# Patient Record
Sex: FEMALE | Race: WHITE | NOT HISPANIC OR LATINO
[De-identification: names, ages, dates, MRNs, and addresses within clinical notes are randomized per-mention and may not be internally consistent; named-entity substitution may affect disease eponyms.]

---
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
## 2024-04-22 ENCOUNTER — APPOINTMENT (OUTPATIENT)
Dept: OBGYN | Facility: CLINIC | Age: 19
End: 2024-04-22
Payer: COMMERCIAL

## 2024-04-22 PROCEDURE — 99441: CPT

## 2024-04-22 RX ORDER — NORGESTIMATE AND ETHINYL ESTRADIOL 0.25-0.035
0.25-35 KIT ORAL DAILY
Qty: 3 | Refills: 3 | Status: ACTIVE | COMMUNITY
Start: 2023-11-21 | End: 1900-01-01

## 2024-10-24 ENCOUNTER — APPOINTMENT (OUTPATIENT)
Dept: PEDIATRICS | Facility: CLINIC | Age: 19
End: 2024-10-24
Payer: COMMERCIAL

## 2024-10-24 VITALS — OXYGEN SATURATION: 95 % | TEMPERATURE: 99.1 F

## 2024-10-24 DIAGNOSIS — Z01.419 ENCOUNTER FOR GYNECOLOGICAL EXAMINATION (GENERAL) (ROUTINE) W/OUT ABNORMAL FINDINGS: ICD-10-CM

## 2024-10-24 DIAGNOSIS — Z11.3 ENCOUNTER FOR SCREENING FOR INFECTIONS WITH A PREDOMINANTLY SEXUAL MODE OF TRANSMISSION: ICD-10-CM

## 2024-10-24 DIAGNOSIS — B36.0 PITYRIASIS VERSICOLOR: ICD-10-CM

## 2024-10-24 DIAGNOSIS — Z30.41 ENCOUNTER FOR SURVEILLANCE OF CONTRACEPTIVE PILLS: ICD-10-CM

## 2024-10-24 LAB
FLUAV SPEC QL CULT: NEGATIVE
FLUBV AG SPEC QL IA: NEGATIVE
S PYO AG SPEC QL IA: NEGATIVE
SARS-COV-2 AG RESP QL IA.RAPID: NEGATIVE

## 2024-10-24 PROCEDURE — 94667 MNPJ CHEST WALL 1ST: CPT

## 2024-10-24 PROCEDURE — 94640 AIRWAY INHALATION TREATMENT: CPT

## 2024-10-24 PROCEDURE — 99215 OFFICE O/P EST HI 40 MIN: CPT | Mod: 25

## 2024-10-24 PROCEDURE — 87880 STREP A ASSAY W/OPTIC: CPT | Mod: QW

## 2024-10-24 PROCEDURE — 87811 SARS-COV-2 COVID19 W/OPTIC: CPT | Mod: QW

## 2024-10-24 PROCEDURE — 87804 INFLUENZA ASSAY W/OPTIC: CPT | Mod: QW

## 2024-10-24 RX ORDER — AZITHROMYCIN 250 MG/1
250 TABLET, FILM COATED ORAL
Qty: 1 | Refills: 0 | Status: ACTIVE | COMMUNITY
Start: 2024-10-24 | End: 1900-01-01

## 2024-10-24 RX ORDER — PREDNISONE 10 MG/1
10 TABLET ORAL
Qty: 30 | Refills: 0 | Status: ACTIVE | COMMUNITY
Start: 2024-10-24 | End: 1900-01-01

## 2024-10-24 RX ORDER — PREDNISOLONE SODIUM PHOSPHATE 15 MG/5ML
15 SOLUTION ORAL
Qty: 0 | Refills: 0 | Status: COMPLETED | OUTPATIENT
Start: 2024-10-24

## 2024-10-24 RX ADMIN — PREDNISOLONE SODIUM PHOSPHATE 20 MG/5ML: 15 SOLUTION ORAL at 00:00

## 2024-10-24 NOTE — REVIEW OF SYSTEMS
[Fever] : fever [Malaise] : malaise [Difficulty with Sleep] : difficulty with sleep [Headache] : headache [Nasal Discharge] : nasal discharge [Nasal Congestion] : nasal congestion [Chest Pain] : chest pain [Cough] : cough [Shortness of Breath] : shortness of breath [Appetite Changes] : appetite changes [Negative] : Skin

## 2024-10-24 NOTE — PHYSICAL EXAM
[Acute Distress] : no acute distress [Alert] : alert [EOMI] : grossly EOMI [Clear] : right tympanic membrane clear [Clear Rhinorrhea] : clear rhinorrhea [Erythematous Oropharynx] : erythematous oropharynx [Supple] : supple [Regular Rate and Rhythm] : regular rate and rhythm [Soft] : soft [Moves All Extremities x 4] : moves all extremities x4 [Normotonic] : normotonic [Warm] : warm [FreeTextEntry7] : Diffuse wheezes and some rales noted on left anterior lung field with rhonchi as well- chest is "tight"

## 2024-10-24 NOTE — DISCUSSION/SUMMARY
[FreeTextEntry1] : 20 y/o F with Asthmatic Bronchitis/Mild respiratory distress/CP/SOB/Persistent cough- Quick Strep negative Quick Flu negative for A and B Rapid COVID negative T/C sent Flu panel sent. O2 sat 95% on RA Gennaro was uncomfortable with persistent cough/tight chest- Atrovent and Albuterol nebulizer treatment given along with Chest PT- feels more comfortable and breathing seems easier- on exam no more rales auscultated on left anterior lung field- still with diffuse wheezes and rhonchi- improved air entry and definitely more comfortable. Orapred 60 mg given. Prednisone weaning dose as directed with food. Zithromax as directed with food for 5 days. Increase clear fluids/ Ices/ Steam/ Chest PT/ Albuterol inhaler 2-4 puffs every 4 hours WA and as needed/ Probiotics/ Tylenol and/or Motrin as needed. Ques addressed. Kylah verbalizes understanding. Recheck in 4-5 days or sooner if needed. Check back if symptoms do not improve or worsen or if any questions/concerns. Time spent patient/chart - 43 mins.

## 2024-10-24 NOTE — HISTORY OF PRESENT ILLNESS
[de-identified] : Fever/Cough [FreeTextEntry6] : Started 5 days ago with lightheaded and dizzy and bad HA.  Started to have some nasal congestion. 4 days ago, went to health center, had over 102* fever- still with body aches and fatigue and congestion and HA. Also, started to have hacky and phlegmy cough that she felt more in chest with some wheezing- started Albuterol TID. Cough feels phlegmier and she feels it more in her throat. Tmax 103.2* - last fever was yesterday evening. No testing was done at health center. Now, has HAS still and body aches and feels weak. Chest hurts when she coughs. Has some SOB when coughs a lot. No ear pain or pressure.  No sore throat. No SA/N/V/D/C/loose stools.  Less appetite.  Restless sleep.  Possible sick contacts at school.

## 2024-10-25 LAB
INFLUENZA A RESULT: NOT DETECTED
INFLUENZA B RESULT: NOT DETECTED
RESP SYN VIRUS RESULT: NOT DETECTED
SARS-COV-2 RESULT: NOT DETECTED

## 2024-10-26 ENCOUNTER — NON-APPOINTMENT (OUTPATIENT)
Age: 19
End: 2024-10-26

## 2024-10-26 LAB — BACTERIA THROAT CULT: NORMAL

## 2024-10-28 ENCOUNTER — APPOINTMENT (OUTPATIENT)
Dept: PEDIATRICS | Facility: CLINIC | Age: 19
End: 2024-10-28
Payer: COMMERCIAL

## 2024-10-28 VITALS — OXYGEN SATURATION: 99 % | TEMPERATURE: 98 F | HEART RATE: 88 BPM

## 2024-10-28 DIAGNOSIS — J45.901 UNSPECIFIED ASTHMA WITH (ACUTE) EXACERBATION: ICD-10-CM

## 2024-10-28 DIAGNOSIS — R06.02 SHORTNESS OF BREATH: ICD-10-CM

## 2024-10-28 DIAGNOSIS — R50.9 FEVER, UNSPECIFIED: ICD-10-CM

## 2024-10-28 DIAGNOSIS — R05.3 CHRONIC COUGH: ICD-10-CM

## 2024-10-28 DIAGNOSIS — J45.909 UNSPECIFIED ASTHMA, UNCOMPLICATED: ICD-10-CM

## 2024-10-28 DIAGNOSIS — R06.03 ACUTE RESPIRATORY DISTRESS: ICD-10-CM

## 2024-10-28 DIAGNOSIS — M94.0 CHONDROCOSTAL JUNCTION SYNDROME [TIETZE]: ICD-10-CM

## 2024-10-28 DIAGNOSIS — R07.9 CHEST PAIN, UNSPECIFIED: ICD-10-CM

## 2024-10-28 DIAGNOSIS — Z09 ENCOUNTER FOR FOLLOW-UP EXAMINATION AFTER COMPLETED TREATMENT FOR CONDITIONS OTHER THAN MALIGNANT NEOPLASM: ICD-10-CM

## 2024-10-28 PROCEDURE — 99214 OFFICE O/P EST MOD 30 MIN: CPT

## 2024-10-28 RX ORDER — BUDESONIDE AND FORMOTEROL FUMARATE DIHYDRATE 80; 4.5 UG/1; UG/1
80-4.5 AEROSOL RESPIRATORY (INHALATION) TWICE DAILY
Qty: 1 | Refills: 2 | Status: ACTIVE | COMMUNITY
Start: 2024-10-28 | End: 1900-01-01

## 2024-10-28 RX ORDER — BROMPHENIRAMINE MALEATE, PSEUDOEPHEDRINE HYDROCHLORIDE, 2; 30; 10 MG/5ML; MG/5ML; MG/5ML
30-2-10 SYRUP ORAL
Qty: 240 | Refills: 1 | Status: ACTIVE | COMMUNITY
Start: 2024-10-28 | End: 1900-01-01

## 2024-10-28 NOTE — DISCUSSION/SUMMARY
[FreeTextEntry1] : 18 y/o F with Mild respiratory distress- resolved Still with Asthmatic bronchitis/Persistent cough with wheezing/Nasal congestion- Also- Costochondritis- O2 sat 99% RA Will continue Prednisone as directed with food. Continue Flonase as directed daily. Add Symbicort BID and will maintain. Bromfed DM at bedtime as needed. Increase clear fluids/ Ices/ Steam/ Chest PT/ Albuterol inhaler every 4 hours/ Probiotics/ Tylenol and/or Motrin as needed. Heating pad and warm soaks for chest/Rest and sit well supported when possible/Refrain from physical activity for at least 1 week. Ques addressed. Kylah verbalizes understanding. Recheck in 1 week or sooner if needed. Check back if symptoms do not improve or worsen or if any questions/concerns. Time spent patient/chart - 33 mins.

## 2024-10-28 NOTE — HISTORY OF PRESENT ILLNESS
[de-identified] : Mild respiratory distress/Asthmatic Bronchitis/CP/SOB [FreeTextEntry6] : Doing better, but still coughing, but feels she is moving air better.  Chest still hurts when she coughs.  She has more energy, but she is still tired 2* to coughing at night still. Afebrile.  Improved appetite. No HAS.  Congestion slowly improving. No SA/V/D/C/loose stools. On day #4 of Prednisone and last day of Zithromax.  Has been using Flonase daily and Albuterol Inhaler every 4 hours. Ques addressed.

## 2024-10-28 NOTE — HISTORY OF PRESENT ILLNESS
[de-identified] : Mild respiratory distress/Asthmatic Bronchitis/CP/SOB [FreeTextEntry6] : Doing better, but still coughing, but feels she is moving air better.  Chest still hurts when she coughs.  She has more energy, but she is still tired 2* to coughing at night still. Afebrile.  Improved appetite. No HAS.  Congestion slowly improving. No SA/V/D/C/loose stools. On day #4 of Prednisone and last day of Zithromax.  Has been using Flonase daily and Albuterol Inhaler every 4 hours. Ques addressed.

## 2024-10-28 NOTE — DISCUSSION/SUMMARY
[FreeTextEntry1] : 20 y/o F with Mild respiratory distress- resolved Still with Asthmatic bronchitis/Persistent cough with wheezing/Nasal congestion- Also- Costochondritis- O2 sat 99% RA Will continue Prednisone as directed with food. Continue Flonase as directed daily. Add Symbicort BID and will maintain. Bromfed DM at bedtime as needed. Increase clear fluids/ Ices/ Steam/ Chest PT/ Albuterol inhaler every 4 hours/ Probiotics/ Tylenol and/or Motrin as needed. Heating pad and warm soaks for chest/Rest and sit well supported when possible/Refrain from physical activity for at least 1 week. Ques addressed. Kylah verbalizes understanding. Recheck in 1 week or sooner if needed. Check back if symptoms do not improve or worsen or if any questions/concerns. Time spent patient/chart - 33 mins.

## 2024-10-28 NOTE — PHYSICAL EXAM
[Alert] : alert [Clear] : right tympanic membrane clear [Supple] : supple [Tenderness With Palpation of Chest Wall] : tenderness with palpation of chest wall [Regular Rate and Rhythm] : regular rate and rhythm [Soft] : soft [Moves All Extremities x 4] : moves all extremities x4 [Normotonic] : normotonic [Warm] : warm [Acute Distress] : no acute distress [Erythematous Oropharynx] : nonerythematous oropharynx [FreeTextEntry4] : Clearing [FreeTextEntry7] : Improved air entry- scattered wheezes and rhonchi- no rales

## 2024-10-28 NOTE — REVIEW OF SYSTEMS
[Malaise] : malaise [Difficulty with Sleep] : difficulty with sleep [Nasal Congestion] : nasal congestion [Chest Pain] : chest pain [Wheezing] : wheezing [Cough] : cough [Negative] : Skin

## 2025-05-15 ENCOUNTER — APPOINTMENT (OUTPATIENT)
Dept: OBGYN | Facility: CLINIC | Age: 20
End: 2025-05-15
Payer: COMMERCIAL

## 2025-05-15 ENCOUNTER — NON-APPOINTMENT (OUTPATIENT)
Age: 20
End: 2025-05-15

## 2025-05-15 VITALS
WEIGHT: 160 LBS | BODY MASS INDEX: 21.67 KG/M2 | DIASTOLIC BLOOD PRESSURE: 80 MMHG | SYSTOLIC BLOOD PRESSURE: 118 MMHG | HEIGHT: 72 IN

## 2025-05-15 DIAGNOSIS — Z01.411 ENCOUNTER FOR GYNECOLOGICAL EXAMINATION (GENERAL) (ROUTINE) WITH ABNORMAL FINDINGS: ICD-10-CM

## 2025-05-15 DIAGNOSIS — N94.6 DYSMENORRHEA, UNSPECIFIED: ICD-10-CM

## 2025-05-15 DIAGNOSIS — Z11.3 ENCOUNTER FOR SCREENING FOR INFECTIONS WITH A PREDOMINANTLY SEXUAL MODE OF TRANSMISSION: ICD-10-CM

## 2025-05-15 PROCEDURE — 99395 PREV VISIT EST AGE 18-39: CPT

## 2025-05-15 PROCEDURE — 99213 OFFICE O/P EST LOW 20 MIN: CPT | Mod: 25

## 2025-05-15 PROCEDURE — 36415 COLL VENOUS BLD VENIPUNCTURE: CPT

## 2025-05-15 RX ORDER — DROSPIRENONE 4 MG/1
4 TABLET, FILM COATED ORAL
Qty: 3 | Refills: 3 | Status: ACTIVE | COMMUNITY
Start: 2025-05-15 | End: 1900-01-01

## 2025-05-15 NOTE — HISTORY OF PRESENT ILLNESS
[FreeTextEntry1] : NIYA CAREY 19 year, G0, LMP: 25,  presents for her annual GYN exam.   Pt reports 9/10 menstrual pain on day 1-2 of menses despite being on Patricia No vaginal discharge or vaginitis symptoms. No urinary complaints. BM is normal per patient.  OBHx:   GYNhx: denies  PMH: Hx of URI, bronchitis, and pneumonia-now resolved  PSH: denies  Med: Albuterol, Patricia  All: NKDA  Soc: denies tobacco and drugs, socially drinks alcohol  Psych: denies, PHQ=5  FamHx: Heart disease in mother and father. Father drug abuse.  Immunizations (flu, COVID, Gardasil): UTD

## 2025-05-15 NOTE — PLAN
[FreeTextEntry1] : #HCM -Breast self exam reviewed and taught -STI Screening today with GC/CT cultures, HIV, RPR, Hep B/C -Immunizations: UTD  #dysmenorrhea ddx: adenomyosis vs. endometriosis pain not improved with OTC pain medication will switch from Patricia to Slynd she will take it continuously and f/u in 3 mo to eval symptoms tvus to r/o structural causes  f/u in 3 mo and  in 1 year for annual GYN exam or PRN for any GYN complaints ROBERTO Segura MD

## 2025-05-19 LAB
C TRACH RRNA SPEC QL NAA+PROBE: NOT DETECTED
HBV SURFACE AG SER QL: NONREACTIVE
HCV AB SER QL: NONREACTIVE
HCV S/CO RATIO: 0.13 S/CO
HIV1+2 AB SPEC QL IA.RAPID: NONREACTIVE
N GONORRHOEA RRNA SPEC QL NAA+PROBE: NOT DETECTED
SOURCE AMPLIFICATION: NORMAL
T PALLIDUM AB SER QL IA: NEGATIVE

## 2025-05-22 ENCOUNTER — APPOINTMENT (OUTPATIENT)
Dept: OBGYN | Facility: CLINIC | Age: 20
End: 2025-05-22
Payer: COMMERCIAL

## 2025-05-22 ENCOUNTER — ASOB RESULT (OUTPATIENT)
Age: 20
End: 2025-05-22

## 2025-05-22 PROCEDURE — 76830 TRANSVAGINAL US NON-OB: CPT

## 2025-07-14 ENCOUNTER — APPOINTMENT (OUTPATIENT)
Dept: OBGYN | Facility: CLINIC | Age: 20
End: 2025-07-14
Payer: COMMERCIAL

## 2025-07-14 ENCOUNTER — ASOB RESULT (OUTPATIENT)
Age: 20
End: 2025-07-14

## 2025-07-14 PROCEDURE — 76830 TRANSVAGINAL US NON-OB: CPT

## 2025-07-17 ENCOUNTER — NON-APPOINTMENT (OUTPATIENT)
Age: 20
End: 2025-07-17

## 2025-08-14 ENCOUNTER — NON-APPOINTMENT (OUTPATIENT)
Age: 20
End: 2025-08-14

## 2025-08-14 ENCOUNTER — APPOINTMENT (OUTPATIENT)
Dept: OBGYN | Facility: CLINIC | Age: 20
End: 2025-08-14